# Patient Record
Sex: FEMALE | Race: BLACK OR AFRICAN AMERICAN | NOT HISPANIC OR LATINO | Employment: FULL TIME | URBAN - METROPOLITAN AREA
[De-identification: names, ages, dates, MRNs, and addresses within clinical notes are randomized per-mention and may not be internally consistent; named-entity substitution may affect disease eponyms.]

---

## 2020-08-18 ENCOUNTER — HOSPITAL ENCOUNTER (EMERGENCY)
Facility: HOSPITAL | Age: 65
Discharge: HOME | End: 2020-08-18
Attending: EMERGENCY MEDICINE
Payer: MEDICAID

## 2020-08-18 VITALS
BODY MASS INDEX: 22.63 KG/M2 | OXYGEN SATURATION: 99 % | TEMPERATURE: 98 F | HEIGHT: 62 IN | SYSTOLIC BLOOD PRESSURE: 158 MMHG | WEIGHT: 123 LBS | HEART RATE: 74 BPM | DIASTOLIC BLOOD PRESSURE: 78 MMHG | RESPIRATION RATE: 18 BRPM

## 2020-08-18 DIAGNOSIS — I73.9 PAD (PERIPHERAL ARTERY DISEASE): ICD-10-CM

## 2020-08-18 LAB
ALBUMIN SERPL BCP-MCNC: 3.6 G/DL (ref 3.5–5.2)
ALP SERPL-CCNC: 92 U/L (ref 55–135)
ALT SERPL W/O P-5'-P-CCNC: 21 U/L (ref 10–44)
ANION GAP SERPL CALC-SCNC: 5 MMOL/L (ref 8–16)
AST SERPL-CCNC: 26 U/L (ref 10–40)
BASOPHILS # BLD AUTO: 0.03 K/UL (ref 0–0.2)
BASOPHILS NFR BLD: 0.4 % (ref 0–1.9)
BILIRUB SERPL-MCNC: 0.3 MG/DL (ref 0.1–1)
BUN SERPL-MCNC: 14 MG/DL (ref 8–23)
CALCIUM SERPL-MCNC: 9.5 MG/DL (ref 8.7–10.5)
CHLORIDE SERPL-SCNC: 108 MMOL/L (ref 95–110)
CO2 SERPL-SCNC: 26 MMOL/L (ref 23–29)
CREAT SERPL-MCNC: 1 MG/DL (ref 0.5–1.4)
DIFFERENTIAL METHOD: ABNORMAL
EOSINOPHIL # BLD AUTO: 0.1 K/UL (ref 0–0.5)
EOSINOPHIL NFR BLD: 0.6 % (ref 0–8)
ERYTHROCYTE [DISTWIDTH] IN BLOOD BY AUTOMATED COUNT: 13.1 % (ref 11.5–14.5)
EST. GFR  (AFRICAN AMERICAN): >60 ML/MIN/1.73 M^2
EST. GFR  (NON AFRICAN AMERICAN): >60 ML/MIN/1.73 M^2
GLUCOSE SERPL-MCNC: 98 MG/DL (ref 70–110)
HCT VFR BLD AUTO: 46.6 % (ref 37–48.5)
HGB BLD-MCNC: 14.7 G/DL (ref 12–16)
IMM GRANULOCYTES # BLD AUTO: 0.01 K/UL (ref 0–0.04)
IMM GRANULOCYTES NFR BLD AUTO: 0.1 % (ref 0–0.5)
INR PPP: 1 (ref 0.8–1.2)
LYMPHOCYTES # BLD AUTO: 2.9 K/UL (ref 1–4.8)
LYMPHOCYTES NFR BLD: 35.9 % (ref 18–48)
MCH RBC QN AUTO: 30 PG (ref 27–31)
MCHC RBC AUTO-ENTMCNC: 31.5 G/DL (ref 32–36)
MCV RBC AUTO: 95 FL (ref 82–98)
MONOCYTES # BLD AUTO: 0.6 K/UL (ref 0.3–1)
MONOCYTES NFR BLD: 7.6 % (ref 4–15)
NEUTROPHILS # BLD AUTO: 4.4 K/UL (ref 1.8–7.7)
NEUTROPHILS NFR BLD: 55.4 % (ref 38–73)
NRBC BLD-RTO: 0 /100 WBC
PLATELET # BLD AUTO: 292 K/UL (ref 150–350)
PMV BLD AUTO: 8.9 FL (ref 9.2–12.9)
POTASSIUM SERPL-SCNC: 4 MMOL/L (ref 3.5–5.1)
PROT SERPL-MCNC: 8 G/DL (ref 6–8.4)
PROTHROMBIN TIME: 10.9 SEC (ref 9–12.5)
RBC # BLD AUTO: 4.9 M/UL (ref 4–5.4)
SODIUM SERPL-SCNC: 139 MMOL/L (ref 136–145)
WBC # BLD AUTO: 7.93 K/UL (ref 3.9–12.7)

## 2020-08-18 PROCEDURE — 85610 PROTHROMBIN TIME: CPT

## 2020-08-18 PROCEDURE — 99285 EMERGENCY DEPT VISIT HI MDM: CPT | Mod: ,,, | Performed by: PHYSICIAN ASSISTANT

## 2020-08-18 PROCEDURE — 99285 PR EMERGENCY DEPT VISIT,LEVEL V: ICD-10-PCS | Mod: ,,, | Performed by: PHYSICIAN ASSISTANT

## 2020-08-18 PROCEDURE — 99284 EMERGENCY DEPT VISIT MOD MDM: CPT | Mod: 25

## 2020-08-18 PROCEDURE — 85025 COMPLETE CBC W/AUTO DIFF WBC: CPT

## 2020-08-18 PROCEDURE — 80053 COMPREHEN METABOLIC PANEL: CPT

## 2020-08-18 RX ORDER — NAPROXEN SODIUM 220 MG/1
81 TABLET, FILM COATED ORAL DAILY
Qty: 30 TABLET | Refills: 0 | Status: SHIPPED | OUTPATIENT
Start: 2020-08-18 | End: 2020-09-17 | Stop reason: SDUPTHER

## 2020-08-18 RX ORDER — TRAMADOL HYDROCHLORIDE 50 MG/1
50 TABLET ORAL EVERY 6 HOURS
COMMUNITY
End: 2021-07-12

## 2020-08-18 RX ORDER — ATORVASTATIN CALCIUM 10 MG/1
10 TABLET, FILM COATED ORAL DAILY
Qty: 30 TABLET | Refills: 0 | Status: SHIPPED | OUTPATIENT
Start: 2020-08-18 | End: 2020-09-17 | Stop reason: SDUPTHER

## 2020-08-18 NOTE — ED TRIAGE NOTES
Judith Villanueva, a 64 y.o. female presents to the ED via personal transportation with CC right calf pain x1 week, also c/o numbness to toes on right foot. Reports injury last week, while moving a bed it hit anterior of her lower leg, scab noted to site, seen by PCP and had an US done, told to come to ED for DVT. Rates pain 5/10 at this time. No complaints of CP or SOB.    Patient identifiers verified verbally with patient and correct for Judith Villanueva.    SKIN: Skin is warm dry and intact, and color is consistent with ethnicity. Capillary refill <3 seconds. No breakdown or brusing visible. Mucus membranes moist, acyanotic.  RESPIRATORY: Airway is open and patent. Respirations-spontaneous, unlabored, regular rate, equal bilaterally on inspiration and expiration. No accessory muscle use noted. Lungs clear to auscultation in all fields bilaterally anterior and posterior.   CARDIAC: Patient has regular heart rate.  No peripheral edema noted, and patient has no c/o chest pain. Peripheral pulses present equal and strong throughout.  ABDOMEN: Soft and non-tender to palpation with no distention noted. Normoactive bowel sounds x4 quadrants. Pt has no complaints of abnormal bowel movements, denies blood in stool. Pt reports normal appetite.   NEUROLOGIC: Eyes open spontaneously and facial expression symmetrical. Pt behavior appropriate to situation, and pt follows commands. Pt reports sensation present in all extremities when touched with a finger, denies any numbness or tingling. PERRLA  MUSCULOSKELETAL: Spontaneous movement noted to all extremities. Hand  equal and leg strength strong +5 bilaterally. Pt c/o right calf pain, skin taut, pedal pulse palpable bilaterally.

## 2020-08-18 NOTE — ED PROVIDER NOTES
Encounter Date: 8/18/2020       History     Chief Complaint   Patient presents with    Deep Vein Thrombosis     had ultrasound today     The patient is a 64 year old female referred to the ER by her PCP due to abnormal venous ultrasound findings of her right leg. She has been c/o calf pain x 2 weeks. Pain worse with walking. She denies any chest pain or SOB. She was told that she has a blood clot.         Review of patient's allergies indicates:  No Known Allergies  History reviewed. No pertinent past medical history.  Past Surgical History:   Procedure Laterality Date    HYSTERECTOMY       History reviewed. No pertinent family history.  Social History     Tobacco Use    Smoking status: Current Every Day Smoker     Packs/day: 0.50     Types: Cigarettes    Smokeless tobacco: Never Used    Tobacco comment: quit today   Substance Use Topics    Alcohol use: Not Currently    Drug use: Not Currently     Review of Systems   Constitutional: Negative for chills, diaphoresis and fever.   Eyes: Negative for pain and visual disturbance.   Respiratory: Negative for chest tightness and shortness of breath.    Cardiovascular: Negative for chest pain.   Gastrointestinal: Negative for abdominal pain, blood in stool, diarrhea, nausea and vomiting.   Genitourinary: Negative for decreased urine volume.   Musculoskeletal: Positive for myalgias. Negative for arthralgias, back pain, gait problem and joint swelling.   Skin: Negative for color change, rash and wound.   Allergic/Immunologic: Negative for immunocompromised state.   Neurological: Negative for dizziness, syncope, weakness, light-headedness, numbness and headaches.   Psychiatric/Behavioral: Negative for confusion.       Physical Exam     Initial Vitals [08/18/20 1537]   BP Pulse Resp Temp SpO2   (!) 171/79 100 18 98.6 °F (37 °C) 97 %      MAP       --         Physical Exam    Nursing note and vitals reviewed.  Constitutional: She appears well-developed and well-nourished.  She is not diaphoretic. No distress.   HENT:   Head: Normocephalic.   Eyes: Conjunctivae are normal.   Neck: Neck supple.   Cardiovascular: Normal rate and regular rhythm.   Pulmonary/Chest: No respiratory distress.   Abdominal: Soft. There is no abdominal tenderness. There is no rebound and no guarding.   Musculoskeletal: Normal range of motion.      Comments: Mild right calf tenderness. Decreased pulses   Neurological: She is alert and oriented to person, place, and time. She has normal strength. No sensory deficit.   Skin: Skin is warm and dry. No rash noted.   Psychiatric: She has a normal mood and affect. Her behavior is normal.         ED Course   Procedures  Labs Reviewed   CBC W/ AUTO DIFFERENTIAL - Abnormal; Notable for the following components:       Result Value    Mean Corpuscular Hemoglobin Conc 31.5 (*)     MPV 8.9 (*)     All other components within normal limits   COMPREHENSIVE METABOLIC PANEL - Abnormal; Notable for the following components:    Anion Gap 5 (*)     All other components within normal limits   PROTIME-INR          Imaging Results           US Lower Extrem Arteries Bilat with HUSEYIN (xpd) (Final result)  Result time 08/18/20 18:56:04   Procedure changed from US Lower Extremity Arteries Bilateral     Final result by Cyrus Kim MD (08/18/20 18:56:04)                 Impression:      Complete occlusion of the right distal superficial femoral artery with collateral vessel formation and reconstitution of flow in the proximal popliteal artery.  Extremely sluggish flow with abnormal monophasic waveforms throughout the distal right lower extremity distal to the site of occlusion.    Abnormal doubling of peak systolic velocities in the left distal superficial femoral artery consistent with hemodynamically significant stenosis.    This report was flagged in Epic as abnormal.    This critical information above was relayed by Dr Man by telephone to the ordering provider Channing Garcia PA-C on  08/18/2020 at 18:40.    Electronically signed by resident: Giancarlo Man  Date:    08/18/2020  Time:    18:30    Electronically signed by: Cyrus Kim MD  Date:    08/18/2020  Time:    18:56             Narrative:    EXAMINATION:  US ARTERIAL LOWER EXTREMITY BILAT WITH HUSEYIN (XPD)    CLINICAL HISTORY:  PAD;  Peripheral vascular disease, unspecified    TECHNIQUE:  Ankle-brachial indices were calculated following measurement of the brachial systolic as well as the posterior tibial and dorsalis pedis systolic pressures.  Additionally, real-time ultrasound as well as Doppler spectral waveform analysis and color flow imaging was performed of the large vessels of both lower extremities.    COMPARISON:  08/18/2020 at 12:32 hours.    FINDINGS:  RIGHT LOWER EXTREMITY:    The peak systolic velocities on the right are as follows, in centimeters/second:    Common femoral artery: 110    Deep femoral artery: 82    Superficial femoral artery, proximal: 30    Superficial femoral artery, mid portion: 54    Superficial femoral artery, distal: 0    Popliteal artery, proximal: 4    Popliteal artery, distal: 16    Anterior tibial artery: 7    Posterior tibial artery: 13    There is complete occlusion of the right distal superficial femoral artery with collateral vessel formation and distal reconstitution of the proximal popliteal artery.  Abnormal monophasic waveforms are present proximal to the site of occlusion in the proximal and mid superficial femoral arteries as well as within the popliteal, anterior tibial, and posterior tibial arteries.  Normal triphasic waveforms are present within the common femoral artery and deep femoral artery.  There is a significant burden of atherosclerotic plaque present throughout right lower extremity.    LEFT LOWER EXTREMITY:    The peak systolic velocities on the left are as follows, in centimeters/second:    Common femoral artery: 71    Deep femoral artery: 85    Superficial femoral artery,  "proximal: 75    Superficial femoral artery, mid portion: 91    Superficial femoral artery, distal: There is abnormal doubling of velocities within the distal superficial femoral artery which measure 97 cm/sec more proximally, increasing to 211 cm/sec at the site of most significant stenosis, and decreased into 121 cm/sec more distally.    Popliteal artery, proximal: 32    Popliteal artery, distal: 51    Anterior tibial artery: 45    Posterior tibial artery: 24    Abnormal monophasic waveforms are present within the deep femoral artery, though it appears widely patent.  Remainder of the left lower extremity arteries maintain normal waveform.  Moderate to severe atherosclerotic burden throughout the left lower extremity.    Systolic blood pressures as follows:    Right ankle: Not obtainable.    Right brachial: 162    Right HUSEYIN: Could not be calculated to significant atherosclerotic burden.    Left ankle: 153    Left brachial: 162    Left HUSEYIN: 0.94                                 Medical Decision Making:   History:   Old Medical Records: I decided to obtain old medical records.  Initial Assessment:   Pt had venous US of RLE done outpatient today ordered by PCP for evaluation of calf pain. US report positive for DVT, prompting her visit to ER. Venous US report also mentions "incidental" arterial stenosis and that arterial studies are recommended. Pt describes bilateral calf pain worse with walking, requiring her to stop, suspicious for claudication. No CP or SOB.   Differential Diagnosis:   DVT, PVD, PAD, PE, etc   Clinical Tests:   Radiological Study: Ordered and Reviewed  ED Management:  I discussed the case with the ER attending physician   I discussed the case in detail with vascular surgeon before and after he evaluated the patient. He states that he reviewed all results and that the patient does not have a DVT, he assumes the area in question is a valve. He states that she does have PAD and claudication. He " requests that she be discharged home on a daily baby aspirin and a statin, with plan to see her in 1 week outpatient.   Pt comfortable with plan, verbalized understanding.                                  Clinical Impression:       ICD-10-CM ICD-9-CM   1. PAD (peripheral artery disease)  I73.9 443.9         Disposition:   Disposition: Discharged  Condition: Stable     ED Disposition Condition    Discharge Stable        ED Prescriptions     Medication Sig Dispense Start Date End Date Auth. Provider    aspirin 81 MG Chew Take 1 tablet (81 mg total) by mouth once daily. 30 tablet 8/18/2020 8/18/2021 Channing Garcia PA-C    atorvastatin (LIPITOR) 10 MG tablet Take 1 tablet (10 mg total) by mouth once daily. 30 tablet 8/18/2020 8/18/2021 Channing Garcia PA-C        Follow-up Information     Follow up With Specialties Details Why Contact Info Additional Information    Abdiel Sales - Cardiovasc Surg [facility] Cardiothoracic Surgery Schedule an appointment as soon as possible for a visit in 1 week  [address] [facility] [id number] [phone] [facility] - Main Building, Clinic 2nd Floor Please park in [facility] and use Clinic elevator    [facility] Emergency Medicine  If symptoms worsen in any way. [address] [facility] [id number] [phone]     Darren Roldan MD Family Medicine Schedule an appointment as soon as possible for a visit  Follow up with primary care in the next 1-2 days for re-evaluation and further management. [address]  [address]  [facility] [address]  [phone]                                        Channing Garcia PA-C  08/19/20 2046

## 2020-08-19 DIAGNOSIS — I73.9 PAD (PERIPHERAL ARTERY DISEASE): Primary | ICD-10-CM

## 2020-08-19 NOTE — CONSULTS
VASCULAR SURGERY SERVICE    CHIEF COMPLAINT:  Right leg abnormal ultrasound finding    HISTORY OF PRESENT ILLNESS: Judith Villanueva is a 64 y.o. female referred to the ER for evaluation of worrisome ultrasound findings.  She reports she dropped an item on her right leg causing pain.  She sought the advice of her primary care doctor via tele health visit, who ordered an ultrasound.  Ultrasound was personally reviewed.  There is a trivial lesion in her left common femoral vein which is likely a valve or area of sclerosis.  Incidentally found on the ultrasound was a right SFA occlusion.  She is a lifelong half pack day smoker.  She has never had any symptoms of peripheral arterial disease before last 2 weeks.  She reports half block cramping in her right calf which resolves with rest.  She adamantly denies rest pain.  She has no ulceration.  She has a strong family history of peripheral arterial disease, reporting several siblings and her father have had bypasses any amputations.    History reviewed. No pertinent past medical history.    Past Surgical History:   Procedure Laterality Date    HYSTERECTOMY         No current facility-administered medications for this encounter.     Current Outpatient Medications:     traMADoL (ULTRAM) 50 mg tablet, Take 50 mg by mouth every 6 (six) hours., Disp: , Rfl:     etodolac (LODINE) 200 MG Cap, Take 1 capsule (200 mg total) by mouth 3 (three) times daily., Disp: 30 capsule, Rfl: 0    Review of patient's allergies indicates:  No Known Allergies    History reviewed. No pertinent family history.    Social History     Tobacco Use    Smoking status: Current Every Day Smoker     Packs/day: 0.50     Types: Cigarettes    Smokeless tobacco: Never Used    Tobacco comment: quit today   Substance Use Topics    Alcohol use: Not Currently    Drug use: Not Currently       REVIEW OF SYSTEMS:  General: No chills, fever, malaise, changes in weight  HEENT: No visual changes, difficulty  "hearing  Cardiovascular: No chest pain, palpitations, claudication  Pulmonary: No dyspnea, cough, wheezing  Gastrointestinal: No nausea, vomiting, diarrhea, constipation  Genitourinary: No dysuria, low urine output, hematuria  Endocrine: No polydipsia, polyphagia  Hematologic: No fatigue with exertion, pica, pallor  Musculoskeletal: No extremity or joint pain, no back pain, no difficulty with ambulation  Neurologic: no seizures, no headaches, no weakness  Psychiatric: no mood disturbance    PHYSICAL EXAM:   BP (!) 158/78 (BP Location: Right arm, Patient Position: Sitting)   Pulse 74   Temp 98.4 °F (36.9 °C) (Oral)   Resp 18   Ht 5' 2" (1.575 m)   Wt 55.8 kg (123 lb)   SpO2 99%   Breastfeeding No   BMI 22.50 kg/m²   Constitutional:  Alert,   Well-appearing  In no distress.   Neurological: Normal speech  no focal findings  CN II - XII grossly intact.    Psychiatric: Mood and affect appropriate and symmetric.   HEENT: Normocephalic / atraumatic  PERRLA  Midline trachea  No scars across the neck   Cardiac: Regular rate and rhythm.   Pulmonary: Normal pulmonary effort.   Abdomen: Soft, not distended.    Skin: Warm without ulcers   Vascular: Palpable left DP  Monophasic right AT signal     DIAGNOSTICS:  EXAMINATION:  US ARTERIAL LOWER EXTREMITY BILAT WITH HUSEYIN (XPD)     CLINICAL HISTORY:  PAD;  Peripheral vascular disease, unspecified     TECHNIQUE:  Ankle-brachial indices were calculated following measurement of the brachial systolic as well as the posterior tibial and dorsalis pedis systolic pressures.  Additionally, real-time ultrasound as well as Doppler spectral waveform analysis and color flow imaging was performed of the large vessels of both lower extremities.     COMPARISON:  08/18/2020 at 12:32 hours.     FINDINGS:  RIGHT LOWER EXTREMITY:     The peak systolic velocities on the right are as follows, in centimeters/second:     Common femoral artery: 110     Deep femoral artery: 82     Superficial femoral " artery, proximal: 30     Superficial femoral artery, mid portion: 54     Superficial femoral artery, distal: 0     Popliteal artery, proximal: 4     Popliteal artery, distal: 16     Anterior tibial artery: 7     Posterior tibial artery: 13     There is complete occlusion of the right distal superficial femoral artery with collateral vessel formation and distal reconstitution of the proximal popliteal artery.  Abnormal monophasic waveforms are present proximal to the site of occlusion in the proximal and mid superficial femoral arteries as well as within the popliteal, anterior tibial, and posterior tibial arteries.  Normal triphasic waveforms are present within the common femoral artery and deep femoral artery.  There is a significant burden of atherosclerotic plaque present throughout right lower extremity.     LEFT LOWER EXTREMITY:     The peak systolic velocities on the left are as follows, in centimeters/second:     Common femoral artery: 71     Deep femoral artery: 85     Superficial femoral artery, proximal: 75     Superficial femoral artery, mid portion: 91     Superficial femoral artery, distal: There is abnormal doubling of velocities within the distal superficial femoral artery which measure 97 cm/sec more proximally, increasing to 211 cm/sec at the site of most significant stenosis, and decreased into 121 cm/sec more distally.     Popliteal artery, proximal: 32     Popliteal artery, distal: 51     Anterior tibial artery: 45     Posterior tibial artery: 24     Abnormal monophasic waveforms are present within the deep femoral artery, though it appears widely patent.  Remainder of the left lower extremity arteries maintain normal waveform.  Moderate to severe atherosclerotic burden throughout the left lower extremity.     Systolic blood pressures as follows:     Right ankle: Not obtainable.     Right brachial: 162     Right HUSEYIN: Could not be calculated to significant atherosclerotic burden.     Left ankle:  153     Left brachial: 162     Left HUSEYIN: 0.94     Impression:     Complete occlusion of the right distal superficial femoral artery with collateral vessel formation and reconstitution of flow in the proximal popliteal artery.  Extremely sluggish flow with abnormal monophasic waveforms throughout the distal right lower extremity distal to the site of occlusion.     Abnormal doubling of peak systolic velocities in the left distal superficial femoral artery consistent with hemodynamically significant stenosis.     This report was flagged in Epic as abnormal.     This critical information above was relayed by Dr Man by telephone to the ordering provider Channing Garcia PA-C on 08/18/2020 at 18:40.      IMPRESSION & PLAN:  64 y.o. female with intermittent claudication right lower extremity.  Despite her worrisome exam, she has no rest pain or tissue loss.  Would recommend best medical therapy for claudication:    -Secondary prevention of atherosclerotic risk factors: Goal BP <140/90, goal LDL <100, goal HbA1C <7.0  -ASA 81mg daily for prevention of MI, stroke, and acute limb ischemia  -High intensity statin (atorvastatin 40mg or rosuvastatin 20mg)  - complete smoking cessation    On review of her ultrasound, I do not feel she has a DVT.  Ultrasound finding likely a valve, or area of sclerosis. No need for anticoagulation.    Recommend she follow up with Dr. Minaya in 1-2 weeks with HUSEYIN.    Julio Bolden MD PGY-7  Vascular and Endovascular Surgery Fellow  08/18/2020

## 2020-08-31 ENCOUNTER — OFFICE VISIT (OUTPATIENT)
Dept: VASCULAR SURGERY | Facility: CLINIC | Age: 65
End: 2020-08-31
Attending: SURGERY
Payer: MEDICAID

## 2020-08-31 ENCOUNTER — HOSPITAL ENCOUNTER (OUTPATIENT)
Dept: VASCULAR SURGERY | Facility: CLINIC | Age: 65
Discharge: HOME | End: 2020-08-31
Attending: SURGERY
Payer: MEDICAID

## 2020-08-31 VITALS
BODY MASS INDEX: 21.59 KG/M2 | TEMPERATURE: 98 F | DIASTOLIC BLOOD PRESSURE: 89 MMHG | WEIGHT: 117.31 LBS | SYSTOLIC BLOOD PRESSURE: 187 MMHG | HEART RATE: 66 BPM | HEIGHT: 62 IN | RESPIRATION RATE: 18 BRPM

## 2020-08-31 DIAGNOSIS — I73.9 PAD (PERIPHERAL ARTERY DISEASE): ICD-10-CM

## 2020-08-31 DIAGNOSIS — I70.211 ATHEROSCLEROSIS OF NATIVE ARTERIES OF EXTREMITIES WITH INTERMITTENT CLAUDICATION, RIGHT LEG: Primary | ICD-10-CM

## 2020-08-31 DIAGNOSIS — I10 ESSENTIAL HYPERTENSION: ICD-10-CM

## 2020-08-31 PROCEDURE — 99213 OFFICE O/P EST LOW 20 MIN: CPT | Mod: S$PBB,,, | Performed by: SURGERY

## 2020-08-31 PROCEDURE — 93923 UPR/LXTR ART STDY 3+ LVLS: CPT | Mod: PBBFAC | Performed by: SURGERY

## 2020-08-31 PROCEDURE — 99999 PR PBB SHADOW E&M-EST. PATIENT-LVL III: CPT | Mod: PBBFAC,,, | Performed by: SURGERY

## 2020-08-31 PROCEDURE — 93923 PR NON-INVASIVE PHYSIOLOGIC STUDY EXTREMITY 3 LEVELS: ICD-10-PCS | Mod: 26,S$PBB,, | Performed by: SURGERY

## 2020-08-31 PROCEDURE — 93923 UPR/LXTR ART STDY 3+ LVLS: CPT | Mod: 26,S$PBB,, | Performed by: SURGERY

## 2020-08-31 PROCEDURE — 99213 PR OFFICE/OUTPT VISIT, EST, LEVL III, 20-29 MIN: ICD-10-PCS | Mod: S$PBB,,, | Performed by: SURGERY

## 2020-08-31 PROCEDURE — 99213 OFFICE O/P EST LOW 20 MIN: CPT | Mod: PBBFAC,25 | Performed by: SURGERY

## 2020-08-31 PROCEDURE — 99999 PR PBB SHADOW E&M-EST. PATIENT-LVL III: ICD-10-PCS | Mod: PBBFAC,,, | Performed by: SURGERY

## 2020-08-31 RX ORDER — CILOSTAZOL 50 MG/1
50 TABLET ORAL 2 TIMES DAILY
Qty: 60 TABLET | Refills: 11 | Status: SHIPPED | OUTPATIENT
Start: 2020-08-31 | End: 2021-07-12

## 2020-08-31 RX ORDER — GABAPENTIN 300 MG/1
300 CAPSULE ORAL 3 TIMES DAILY
COMMUNITY

## 2020-08-31 RX ORDER — HYDROCODONE BITARTRATE AND ACETAMINOPHEN 5; 325 MG/1; MG/1
1 TABLET ORAL EVERY 6 HOURS PRN
COMMUNITY
End: 2021-07-12

## 2020-08-31 NOTE — PROGRESS NOTES
VASCULAR SURGERY NOTE    Patient ID: Judith Villanueva is a 64 y.o. female.    I. HISTORY     Chief Complaint: RLE claudication    HPI: Judith Villanueva is a 64 y.o. female who is here today for new patient initial appointment.  She was referred for RLE claudication after an ED visit on 20.  She complains of right leg pain after walking one block. On the day she presented to the ED, she was having to stop 4-5 times per block. The pain resolves with rest. The quality of the pain is deep and cramping pain in her calf. She denies any claudication symptoms in her left leg. Since the ED visit, she has quit smoking.  She has noticed a mild improvement in her symptoms and now has to stop 1-2 times per block when before it was 4-5.  She said that prior to her presentation in the ER it took her about an hour to get home from her work and it is only a 3 block walk.  She is very scared of losing her leg because she has multiple family members who have had PA D and required amputations.  This previous experience has helped her to quit smoking since her ER visit.  She denies any history of coronary artery disease or history of aneurysm.    No MI or stroke.  Extensive family history of PAD and amputations. Denies family history of aneurysms.   Denies EtOH use. Quit 35 years ago.      Past Medical History:   Diagnosis Date    Hyperlipidemia     PVD (peripheral vascular disease)         Past Surgical History:   Procedure Laterality Date    HYSTERECTOMY         Social History     Tobacco Use   Smoking Status Former Smoker    Packs/day: 0.50    Types: Cigarettes    Quit date: 2020    Years since quittin.0   Smokeless Tobacco Never Used        Review of Systems   Constitution: Negative for chills and fever.   HENT: Negative for ear pain and hoarse voice.    Eyes: Negative for discharge and pain.   Cardiovascular: Negative for chest pain and palpitations.   Respiratory: Negative for cough, hemoptysis and shortness  of breath.    Endocrine: Negative for polydipsia and polyuria.   Skin: Negative for dry skin and rash.   Musculoskeletal: Negative for back pain and neck pain.   Gastrointestinal: Negative for abdominal pain, diarrhea, nausea and vomiting.   Genitourinary: Positive for frequency. Negative for dysuria and hematuria.   Neurological: Negative for dizziness and paresthesias.   All other systems reviewed and are negative.        II. PHYSICAL EXAM     Physical Exam   GEN: Alert/oriented, normal affect, normal speech  HEENT: atraumatic, normocephalic  CHEST: normal respiratory effort, symmetrical chest rise  CARD: Regular rate, regular rhythm  EXT: Warm, no cyanosis/edema  SKIN:  Normal texture, no rash, normal moisture content, no wounds  VASC:   RLE: Monophasic AT, absent PT  LLE: Palpable 2+ DP/PT pulses    III. ASSESSMENT & PLAN (MEDICAL DECISION MAKING)     1. Atherosclerosis of native arteries of extremities with intermittent claudication, right leg    2. Essential hypertension        Imaging Results:    HUSEYIN 08/31/2020:  R L   0.52 1.08       Assessment/Diagnosis and Plan:  64 y.o. female with 1 block RLE claudication.  At this time her symptoms are having a significant impact on her life but she is able to get to and from work and perform her activities of daily living (albeit with some difficulty).  I discussed medical and surgical treatment of claudication and low risk of limb loss (1% annually) with claudication symptoms.  I explained the treatment algorithm of medical treatment 1st with exercise/walking, cilostazol, hydration.  If medical therapy does not adequately improve her symptoms after 3 months then we can consider surgical intervention at that time.  She was hypertensive with /89mmHg in the office today. She denies history of HTN.  I encouraged her to follow-up with her PCP as soon as possible for repeat blood pressure check and adjustment of medications for goal BP <140/90.  The patient was in  agreement with this plan and all her questions were answered close were visit.    -Walking program at least 4x/week for 30min per session. Instructions provided  -Hydration 3L/day  -Start Cilostazol 50mg BID x7days then increase to 100mg BID thereafter if tolerated  -Encouraged the patient to continue smoking abstinence  -Continue ASA 81mg daily recommended for all patients with PAD  -Continue statin -- will increase to 20mg at next refill if patient tolerating current dose  -Recommend f/u with PCP for BP check and possible medication   -Control of atherosclerotic risk factors: Goal LDL <100, Goal HbA1C <7, Goal BP <140/90  -Follow up 3 months for symptom reassessment. If symptoms worsen to pain at rest or she is unable to go to work then I instructed her to call us to schedule an earlier appointment.    RADHA Minaya II, MD, RPVI  Vascular Surgeon  [facility]

## 2020-08-31 NOTE — LETTER
September 3, 2020      Julio Bolden MD  [address]  [facility] [address]           Abdiel [facility]vez - Vascular Surg 5th Fl  [address]  [facility] [address]  Phone: [phone]  Fax: [phone]          Patient: Judith Villanueva   MR Number: [id number]   YOB: 1955   Date of Visit: 8/31/2020       Dear Dr. Julio Bolden:    Thank you for referring Judith Villanueva to me for evaluation. Attached you will find relevant portions of my assessment and plan of care.    If you have questions, please do not hesitate to call me. I look forward to following Judith Villanueva along with you.    Sincerely,    RADHA Minaya II, MD    Enclosure  CC:  No Recipients    If you would like to receive this communication electronically, please contact [email or web address] or [phone] to request more information on [employer] Link access.    For providers and/or their staff who would like to refer a patient to [facility], please contact us through our one-stop-shop provider referral line, [facility], at [phone].    If you feel you have received this communication in error or would no longer like to receive these types of communications, please e-mail [email or web address]

## 2020-09-03 NOTE — PATIENT INSTRUCTIONS
A Walking Program for Peripheral Arterial Disease (PAD)  Peripheral arterial disease (PAD) is a condition where the arteries in the legs are severely damaged. When you have PAD, walking can be painful. So you may start to walk less. Walking less makes your leg muscles weaker. It then becomes harder and more painful to walk. A walking program can break this cycle. This sheet gives you tips on how to get started.     Walking farther without pain  Exercise strengthens leg muscles that are out of shape. It also trains these muscles to work with less oxygen. This helps your leg muscles work better even with reduced blood flow to your legs. When you have PAD, a walking program can be helpful. Your program can:  · Let you walk longer and farther without claudication. This is an ache in your legs during exercise that goes away with rest.  · Let you do more and be more active  · Add to your overall health and well-being  · Help you control your blood sugar and blood pressure  · Help you become healthier with no risk and at little or no cost  Getting started  Your local hospital, vascular center, or cardiac rehab center may have a special walking program for people with PAD. If so, this is your best option. But if you cant find a program, or its not covered by insurance, you can still walk on your own. Follow these steps at each session:  · Step 1. Start at a pace that lets you walk for 5 to 10 minutes before you start to feel claudication. This feeling is unpleasant, but it doesnt hurt you. Keep going until the pain makes you feel that you need to stop.  · Step 2. Stop and rest for 3 to 5 minutes, just long enough for the pain to go away. You can rest standing or sitting. (Some people like to bring along a cane or a lightweight folding chair.)  · Step 3. Again walk at a pace that lets you walk for 5 to 10 minutes more before you feel pain. This may be slower than your starting pace in step 1. Then rest again.  · Step 4.  Repeat this process until youve walked for 45 minutes. This should be about 60 to 80 minutes total, including resting time. You may not be able to do a full 45 minutes at first. Do as much as you can, and increase your time as you improve.  Making the most of your program  · Walk at least 3 times a week. Take no more than 2 days off between sessions. If you stop walking, even for a week or two, you can lose the health benefits of your program.  · Find a good place to walk. A treadmill or a track may be better at first. That way, you wont run the risk of going too far and finding that you cant walk back. Be sure to have a place to walk indoors in bad weather, such as a gym or a mall.  · Wear shoes with sturdy, flexible soles. The heel should fit without slipping. You should have room to wiggle your toes.  · Keep track of how long you walk. A pedometer will show your daily progress. It can also show how much farther you can walk as time goes by.  · Ask a friend to keep you company. You may enjoy walking with someone else. Or you may want to make your walking sessions a time to relax by yourself.  · Make it fun. Listen to music while you walk and rest. Walk in a favorite park. Get to know the people at the gym, or the folks that you pass on your route. While you rest, you can window-shop, read, or feed the birds. Do whatever will help you enjoy your exercise sessions.  Date Last Reviewed: 6/1/2016 © 2000-2017 [employer]. [address] [place] [address], [place] [address]. All rights reserved. This information is not intended as a substitute for professional medical care. Always follow your healthcare professional's instructions.

## 2020-09-16 ENCOUNTER — PATIENT MESSAGE (OUTPATIENT)
Dept: VASCULAR SURGERY | Facility: CLINIC | Age: 65
End: 2020-09-16

## 2020-09-16 RX ORDER — CILOSTAZOL 50 MG/1
50 TABLET ORAL 2 TIMES DAILY
Qty: 60 TABLET | Refills: 11 | Status: CANCELLED | OUTPATIENT
Start: 2020-09-16 | End: 2021-09-16

## 2020-09-17 RX ORDER — ATORVASTATIN CALCIUM 10 MG/1
10 TABLET, FILM COATED ORAL DAILY
Qty: 30 TABLET | Refills: 11 | Status: SHIPPED | OUTPATIENT
Start: 2020-09-17 | End: 2020-11-30

## 2020-09-17 RX ORDER — NAPROXEN SODIUM 220 MG/1
81 TABLET, FILM COATED ORAL DAILY
Qty: 30 TABLET | Refills: 11 | Status: SHIPPED | OUTPATIENT
Start: 2020-09-17 | End: 2022-01-11 | Stop reason: SDUPTHER

## 2020-09-25 ENCOUNTER — PATIENT MESSAGE (OUTPATIENT)
Dept: VASCULAR SURGERY | Facility: CLINIC | Age: 65
End: 2020-09-25

## 2020-09-29 ENCOUNTER — PATIENT MESSAGE (OUTPATIENT)
Dept: VASCULAR SURGERY | Facility: CLINIC | Age: 65
End: 2020-09-29

## 2020-10-15 ENCOUNTER — PATIENT MESSAGE (OUTPATIENT)
Dept: VASCULAR SURGERY | Facility: CLINIC | Age: 65
End: 2020-10-15

## 2020-11-04 ENCOUNTER — TELEPHONE (OUTPATIENT)
Dept: SURGERY | Facility: CLINIC | Age: 65
End: 2020-11-04

## 2020-11-04 DIAGNOSIS — Z12.11 SCREENING FOR COLON CANCER: Primary | ICD-10-CM

## 2020-11-04 RX ORDER — SODIUM CHLORIDE, SODIUM LACTATE, POTASSIUM CHLORIDE, CALCIUM CHLORIDE 600; 310; 30; 20 MG/100ML; MG/100ML; MG/100ML; MG/100ML
INJECTION, SOLUTION INTRAVENOUS CONTINUOUS
Status: CANCELLED | OUTPATIENT
Start: 2020-11-04

## 2020-11-04 RX ORDER — SODIUM CHLORIDE 0.9 % (FLUSH) 0.9 %
3 SYRINGE (ML) INJECTION
Status: CANCELLED | OUTPATIENT
Start: 2020-11-04

## 2020-11-04 RX ORDER — POLYETHYLENE GLYCOL 3350, SODIUM SULFATE ANHYDROUS, SODIUM BICARBONATE, SODIUM CHLORIDE, POTASSIUM CHLORIDE 236; 22.74; 6.74; 5.86; 2.97 G/4L; G/4L; G/4L; G/4L; G/4L
4 POWDER, FOR SOLUTION ORAL ONCE
Qty: 4000 ML | Refills: 0 | Status: SHIPPED | OUTPATIENT
Start: 2020-11-04 | End: 2020-11-04

## 2020-11-04 NOTE — TELEPHONE ENCOUNTER
Called patient in reference to a referral to Colorectal Surgery for colon cancer screening. Patient verbally consented to a Colonoscopy and requested to be scheduled for a Colonoscopy on 12/02/2020. Patient was advised a designated  is required on the day of the Colonoscopy to drive the patient home and the  must be at least. 18 years old.Colonoscopy Prep instructions were thoroughly explained and discussed with the patient. Patient acknowledges understanding Prep instructions. Patient was advised the Colonoscopy Prep instructions discussed and explained on the phone , are being mailed out to the patient's address on file. Patient's address on file was verified with the patient for accuracy of mailing. Patient's medications on file was reviewed with the patient for accuracy of information. Patient denies taking  any other medications other than those listed and verified on medication profile.Patient was explained the Colonoscopy will be performed here at [facility]. Patient was further explained the Pre-Op will call one day prior to the procedure to discuss Pre-Op instructions and what time to report for the Colonoscopy. The patient was given the opportunity to ask any questions about the Colonoscopy. No further issues were discussed.

## 2020-11-04 NOTE — TELEPHONE ENCOUNTER
----- Message from Hanna Pollard sent at 11/4/2020  9:57 AM CST -----  Regarding: colonoscopy appt  Hi Dr rogers and staff,     JADON Raymond is referring this pt to see you for a colonoscopy     Can you plz call them to scheduled?     The Order and records are in media     I'll check epic for appt and chart for updates.     Thank you,  Hanna Pollard   [facility]    [id number]

## 2020-11-04 NOTE — TELEPHONE ENCOUNTER
Good morning! Dr Aaron palomares MD - This patient is scheduled to have a Colonoscopy on 12/02/2020. A review of the patient's medication profile denotes ASPIRIN 81 MG by mouth daily, and PLETAL 100 MG by mouth twice daily. Will yo please advise on how this patient is ts to take both of the afore mentioned medications Pre and Post Colonoscopy - Thanks.

## 2020-11-09 ENCOUNTER — TELEPHONE (OUTPATIENT)
Dept: SURGERY | Facility: CLINIC | Age: 65
End: 2020-11-09

## 2020-11-09 ENCOUNTER — DOCUMENTATION ONLY (OUTPATIENT)
Dept: SURGERY | Facility: CLINIC | Age: 65
End: 2020-11-09

## 2020-11-09 NOTE — PROGRESS NOTES
Message  Received: Today  Message MD Terrell Cox II, LPN   Caller: Unspecified (Today, 11:33 AM)             Patient can keep taking the medication through the procedure if the proceduralist is ok with it. Otherwise patient can stop 3 days before (or whenever proceduralist would like to stop it) and resume the day after (or whenever the proceduralist is ok with resuming it). There is no risk to stopping the medication.  Thanks

## 2020-11-09 NOTE — TELEPHONE ENCOUNTER
Good morning Dr Aaron Ramos MD.  First let me say thanks for your prompt response to this very important patient care matter.   Just to refresh you on the conversation in our previous correspondence. This patient is scheduled to have a Colonoscopy at [facility] on 12/02/2020. The patient is noted to have the Anticoagulant (PLEATAL) 50 MG two tablets PO twice daily and Aspirin 81 MG PO daily in the medication profile. As the patient identified managing provider of this patient's medication, I previouly requested to please advise on a mediication routine for the afore mentioned medications. You suggested that the provider of the procedure should make the determination on when to start and stop the PLEATAL.Typically in this situation the managing provider of the the medication, makes the determination on how the patient should take the medication, Pre and Post procedure. Please advise on how should continue to move forward with this Medication Clearance request to achieve the best patient outcome. I am greatly appreciative of all the time you are giving this patient care matter. Thanks.

## 2020-11-12 ENCOUNTER — TELEPHONE (OUTPATIENT)
Dept: SURGERY | Facility: CLINIC | Age: 65
End: 2020-11-12

## 2020-11-12 NOTE — TELEPHONE ENCOUNTER
----- Message from Paulie Regan sent at 11/12/2020  3:13 PM CST -----  Regarding: [facility]  Contact: [facility]  Pharmacy is calling to let office know colonoscopy kit is is on back order. They have others to recommend.        [phone]

## 2020-11-12 NOTE — PROGRESS NOTES
E: Medication Clearance for Colonoscopy.  Received: 2 days ago  Message Contents   MD Terrell Martinez LPN             Thanks bonnie Tejada for the delay     Please hold 3 days prior

## 2020-11-12 NOTE — TELEPHONE ENCOUNTER
Returned call to Pharmacy regarding back order Colonoscopy Prep, will consult with ordering provider.Called patient to notify, left voice message.

## 2020-11-16 ENCOUNTER — TELEPHONE (OUTPATIENT)
Dept: SURGERY | Facility: CLINIC | Age: 65
End: 2020-11-16

## 2020-11-16 NOTE — TELEPHONE ENCOUNTER
Called patient to instruct on how to take PLEATAL 50 MG PO twice daily  and ASPIRIN 81 MG CHEW PO once daily as directed by managing provider, and agreed upon by proceduralist Pre and Post Colonoscopy. Patient was instructed as follows : stop PLEATAL 50 MG PO TWICE daily, and ASPIRIN 81 MG CHEW PO once daily, three (3) days before Colonoscopy, and resume taking the afore listed medications the next day after Colonoscopy. Patient was able to repeat  The instructions accurately time two (2xs). Patient acknowledge understanding of instructions. Patient was given an opportunity to ask any questions about the instructions given, or any questions about the Colonoscopy.

## 2020-11-30 ENCOUNTER — OFFICE VISIT (OUTPATIENT)
Dept: VASCULAR SURGERY | Facility: CLINIC | Age: 65
End: 2020-11-30
Attending: SURGERY
Payer: MEDICAID

## 2020-11-30 VITALS
WEIGHT: 125.69 LBS | DIASTOLIC BLOOD PRESSURE: 81 MMHG | TEMPERATURE: 99 F | SYSTOLIC BLOOD PRESSURE: 128 MMHG | HEART RATE: 93 BPM | HEIGHT: 62 IN | BODY MASS INDEX: 23.13 KG/M2

## 2020-11-30 DIAGNOSIS — I70.211 ATHEROSCLEROSIS OF NATIVE ARTERIES OF EXTREMITIES WITH INTERMITTENT CLAUDICATION, RIGHT LEG: Primary | ICD-10-CM

## 2020-11-30 PROCEDURE — 99212 PR OFFICE/OUTPT VISIT, EST, LEVL II, 10-19 MIN: ICD-10-PCS | Mod: S$PBB,,, | Performed by: SURGERY

## 2020-11-30 PROCEDURE — 99212 OFFICE O/P EST SF 10 MIN: CPT | Mod: S$PBB,,, | Performed by: SURGERY

## 2020-11-30 PROCEDURE — 99999 PR PBB SHADOW E&M-EST. PATIENT-LVL III: ICD-10-PCS | Mod: PBBFAC,,, | Performed by: SURGERY

## 2020-11-30 PROCEDURE — 99213 OFFICE O/P EST LOW 20 MIN: CPT | Mod: PBBFAC | Performed by: SURGERY

## 2020-11-30 PROCEDURE — 99999 PR PBB SHADOW E&M-EST. PATIENT-LVL III: CPT | Mod: PBBFAC,,, | Performed by: SURGERY

## 2020-11-30 RX ORDER — ATORVASTATIN CALCIUM 20 MG/1
20 TABLET, FILM COATED ORAL DAILY
Qty: 90 TABLET | Refills: 3 | Status: SHIPPED | OUTPATIENT
Start: 2020-11-30 | End: 2021-11-30
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
## 2020-12-19 DIAGNOSIS — U07.1 COVID-19 VIRUS DETECTED: ICD-10-CM

## 2021-04-13 DIAGNOSIS — I73.9 PAD (PERIPHERAL ARTERY DISEASE): Primary | ICD-10-CM

## 2021-04-29 ENCOUNTER — TELEPHONE (OUTPATIENT)
Dept: VASCULAR SURGERY | Facility: CLINIC | Age: 66
End: 2021-04-29

## 2021-07-12 ENCOUNTER — OFFICE VISIT (OUTPATIENT)
Dept: VASCULAR SURGERY | Facility: CLINIC | Age: 66
End: 2021-07-12
Attending: SURGERY
Payer: MEDICAID

## 2021-07-12 ENCOUNTER — HOSPITAL ENCOUNTER (OUTPATIENT)
Dept: VASCULAR SURGERY | Facility: CLINIC | Age: 66
Discharge: HOME | End: 2021-07-12
Attending: SURGERY
Payer: MEDICAID

## 2021-07-12 VITALS
DIASTOLIC BLOOD PRESSURE: 85 MMHG | HEIGHT: 62 IN | HEART RATE: 93 BPM | WEIGHT: 119.06 LBS | TEMPERATURE: 99 F | SYSTOLIC BLOOD PRESSURE: 172 MMHG | BODY MASS INDEX: 21.91 KG/M2

## 2021-07-12 DIAGNOSIS — I73.9 PAD (PERIPHERAL ARTERY DISEASE): ICD-10-CM

## 2021-07-12 DIAGNOSIS — I10 ESSENTIAL HYPERTENSION: ICD-10-CM

## 2021-07-12 DIAGNOSIS — E78.5 HYPERLIPIDEMIA, UNSPECIFIED HYPERLIPIDEMIA TYPE: ICD-10-CM

## 2021-07-12 DIAGNOSIS — F17.210 NICOTINE DEPENDENCE, CIGARETTES, UNCOMPLICATED: ICD-10-CM

## 2021-07-12 DIAGNOSIS — I70.211 ATHEROSCLEROSIS OF NATIVE ARTERIES OF EXTREMITIES WITH INTERMITTENT CLAUDICATION, RIGHT LEG: Primary | ICD-10-CM

## 2021-07-12 PROCEDURE — 93923 PR NON-INVASIVE PHYSIOLOGIC STUDY EXTREMITY 3 LEVELS: ICD-10-PCS | Mod: 26,S$PBB,, | Performed by: SURGERY

## 2021-07-12 PROCEDURE — 93923 UPR/LXTR ART STDY 3+ LVLS: CPT | Mod: PBBFAC | Performed by: SURGERY

## 2021-07-12 PROCEDURE — 99213 OFFICE O/P EST LOW 20 MIN: CPT | Mod: PBBFAC,25 | Performed by: SURGERY

## 2021-07-12 PROCEDURE — 99213 PR OFFICE/OUTPT VISIT, EST, LEVL III, 20-29 MIN: ICD-10-PCS | Mod: 25,S$PBB,, | Performed by: SURGERY

## 2021-07-12 PROCEDURE — 99406 BEHAV CHNG SMOKING 3-10 MIN: CPT | Mod: S$PBB,,, | Performed by: SURGERY

## 2021-07-12 PROCEDURE — 99999 PR PBB SHADOW E&M-EST. PATIENT-LVL III: ICD-10-PCS | Mod: PBBFAC,,, | Performed by: SURGERY

## 2021-07-12 PROCEDURE — 99213 OFFICE O/P EST LOW 20 MIN: CPT | Mod: 25,S$PBB,, | Performed by: SURGERY

## 2021-07-12 PROCEDURE — 93923 UPR/LXTR ART STDY 3+ LVLS: CPT | Mod: 26,S$PBB,, | Performed by: SURGERY

## 2021-07-12 PROCEDURE — 99406 PR TOBACCO USE CESSATION INTERMEDIATE 3-10 MINUTES: ICD-10-PCS | Mod: S$PBB,,, | Performed by: SURGERY

## 2021-07-12 PROCEDURE — 99999 PR PBB SHADOW E&M-EST. PATIENT-LVL III: CPT | Mod: PBBFAC,,, | Performed by: SURGERY

## 2021-07-12 RX ORDER — FLUTICASONE PROPIONATE 50 MCG
2 SPRAY, SUSPENSION (ML) NASAL DAILY
COMMUNITY
Start: 2021-06-23

## 2021-07-12 RX ORDER — CILOSTAZOL 100 MG/1
100 TABLET ORAL 2 TIMES DAILY
COMMUNITY
Start: 2021-06-28 | End: 2022-02-08 | Stop reason: SDUPTHER

## 2021-07-12 RX ORDER — METHOCARBAMOL 500 MG/1
TABLET, FILM COATED ORAL
Status: ON HOLD | COMMUNITY
End: 2021-11-18 | Stop reason: CLARIF

## 2021-10-28 ENCOUNTER — PATIENT MESSAGE (OUTPATIENT)
Dept: SURGERY | Facility: CLINIC | Age: 66
End: 2021-10-28
Payer: MEDICAID

## 2021-10-28 ENCOUNTER — TELEPHONE (OUTPATIENT)
Dept: SURGERY | Facility: CLINIC | Age: 66
End: 2021-10-28
Payer: MEDICAID

## 2021-10-28 DIAGNOSIS — Z12.11 SCREENING FOR COLON CANCER: Primary | ICD-10-CM

## 2021-10-28 RX ORDER — SODIUM CHLORIDE 0.9 % (FLUSH) 0.9 %
3 SYRINGE (ML) INJECTION
Status: CANCELLED | OUTPATIENT
Start: 2021-10-28

## 2021-10-28 RX ORDER — SODIUM, POTASSIUM,MAG SULFATES 17.5-3.13G
1 SOLUTION, RECONSTITUTED, ORAL ORAL DAILY
Qty: 1 KIT | Refills: 0 | Status: SHIPPED | OUTPATIENT
Start: 2021-10-28 | End: 2021-10-30

## 2021-11-22 ENCOUNTER — PATIENT MESSAGE (OUTPATIENT)
Dept: VASCULAR SURGERY | Facility: CLINIC | Age: 66
End: 2021-11-22
Payer: MEDICAID

## 2022-02-08 RX ORDER — CILOSTAZOL 100 MG/1
100 TABLET ORAL 2 TIMES DAILY
Qty: 90 TABLET | Refills: 3 | Status: SHIPPED | OUTPATIENT
Start: 2022-02-08 | End: 2023-07-04 | Stop reason: SDUPTHER

## 2022-10-03 ENCOUNTER — PATIENT MESSAGE (OUTPATIENT)
Dept: VASCULAR SURGERY | Facility: CLINIC | Age: 67
End: 2022-10-03
Payer: MEDICAID

## 2022-10-13 ENCOUNTER — PATIENT MESSAGE (OUTPATIENT)
Dept: VASCULAR SURGERY | Facility: CLINIC | Age: 67
End: 2022-10-13
Payer: MEDICAID

## 2022-10-14 DIAGNOSIS — I70.211 ATHEROSCLEROSIS OF NATIVE ARTERIES OF EXTREMITIES WITH INTERMITTENT CLAUDICATION, RIGHT LEG: Primary | ICD-10-CM

## 2023-02-03 ENCOUNTER — TELEPHONE (OUTPATIENT)
Dept: CARDIOLOGY | Facility: CLINIC | Age: 68
End: 2023-02-03
Payer: MEDICAID

## 2023-02-03 NOTE — TELEPHONE ENCOUNTER
Left voicemail for patient asking for a call back and I will assist with rescheduling missed appointment with NP Hanna Nichols.  Call back number supplied on voicemail.

## 2023-06-26 RX ORDER — NAPROXEN SODIUM 220 MG/1
81 TABLET, FILM COATED ORAL DAILY
Qty: 30 TABLET | Refills: 11 | Status: SHIPPED | OUTPATIENT
Start: 2023-06-26 | End: 2023-08-26 | Stop reason: SDUPTHER

## 2023-07-19 RX ORDER — CILOSTAZOL 100 MG/1
100 TABLET ORAL 2 TIMES DAILY
Qty: 90 TABLET | Refills: 3 | Status: SHIPPED | OUTPATIENT
Start: 2023-07-19

## 2023-08-26 RX ORDER — NAPROXEN SODIUM 220 MG/1
81 TABLET, FILM COATED ORAL DAILY
Qty: 30 TABLET | Refills: 11 | Status: SHIPPED | OUTPATIENT
Start: 2023-08-26 | End: 2024-08-25

## 2023-08-28 DIAGNOSIS — Z12.31 ENCOUNTER FOR SCREENING MAMMOGRAM FOR MALIGNANT NEOPLASM OF BREAST: Primary | ICD-10-CM

## 2025-03-20 DIAGNOSIS — I73.9 PAD (PERIPHERAL ARTERY DISEASE): Primary | ICD-10-CM

## 2025-03-24 ENCOUNTER — TELEPHONE (OUTPATIENT)
Dept: SMOKING CESSATION | Facility: CLINIC | Age: 70
End: 2025-03-24
Payer: MEDICAID

## 2025-03-24 NOTE — TELEPHONE ENCOUNTER
Returned patient's call-Patient would like to reschedule her intake appointment.  Patient is rescheduled on April 02, 2025 at 2:30 PM.

## 2025-04-02 ENCOUNTER — CLINICAL SUPPORT (OUTPATIENT)
Dept: SMOKING CESSATION | Facility: CLINIC | Age: 70
End: 2025-04-02
Payer: COMMERCIAL

## 2025-04-02 DIAGNOSIS — F17.200 NICOTINE DEPENDENCE: Primary | ICD-10-CM

## 2025-04-02 PROCEDURE — 99404 PREV MED CNSL INDIV APPRX 60: CPT | Mod: S$GLB,,,

## 2025-04-02 PROCEDURE — 99999 PR PBB SHADOW E&M-EST. PATIENT-LVL II: CPT | Mod: PBBFAC,,,

## 2025-04-02 RX ORDER — IBUPROFEN 200 MG
1 TABLET ORAL DAILY
Qty: 14 PATCH | Refills: 0 | Status: SHIPPED | OUTPATIENT
Start: 2025-04-02

## 2025-04-02 NOTE — Clinical Note
Patient was seen in clinic today for intake.  Patient smokes 17 cpd.   Patient's CO monitor was 13   ppm.  Patient will begin the prescribed tobacco cessation medication regimen of  21 mg nicotine patch Qd.   Completion of TCRS (Tobacco Cessation Rating Scale) reviewed learned addiction, model, personal reasons for quitting, medications and goals. Prescribed medication management will be by physician.  Patient will continue with sessions and medication monitoring by CTTS.

## 2025-04-03 ENCOUNTER — TELEPHONE (OUTPATIENT)
Dept: PODIATRY | Facility: CLINIC | Age: 70
End: 2025-04-03
Payer: MEDICARE

## 2025-04-03 NOTE — TELEPHONE ENCOUNTER
Spoke with patient and informed her that we don't offer transportation. Pt verbalized understanding and no further issues discussed.----- Message from Marzena sent at 4/3/2025  8:18 AM CDT -----  Regarding: Questions about transportation  Contact: Pt @[phone]  Pt is calling to ask if the office offers transportation to patients. Please c/b to advise..thanks

## 2025-04-15 ENCOUNTER — TELEPHONE (OUTPATIENT)
Dept: SMOKING CESSATION | Facility: CLINIC | Age: 70
End: 2025-04-15
Payer: MEDICARE

## 2025-04-15 NOTE — TELEPHONE ENCOUNTER
Returned patient's call-Patient would like to reschedule her follow up appointment.  Patient is scheduled on April 30, 2025 at 8 AM.

## 2025-04-17 ENCOUNTER — HOSPITAL ENCOUNTER (OUTPATIENT)
Dept: VASCULAR SURGERY | Facility: CLINIC | Age: 70
Discharge: HOME | End: 2025-04-17
Attending: SURGERY
Payer: MEDICARE

## 2025-04-17 ENCOUNTER — OFFICE VISIT (OUTPATIENT)
Dept: VASCULAR SURGERY | Facility: CLINIC | Age: 70
End: 2025-04-17
Attending: SURGERY
Payer: MEDICARE

## 2025-04-17 VITALS
HEIGHT: 62 IN | TEMPERATURE: 98 F | HEART RATE: 87 BPM | SYSTOLIC BLOOD PRESSURE: 144 MMHG | WEIGHT: 112.44 LBS | BODY MASS INDEX: 20.69 KG/M2 | DIASTOLIC BLOOD PRESSURE: 84 MMHG

## 2025-04-17 DIAGNOSIS — I73.9 PAD (PERIPHERAL ARTERY DISEASE): ICD-10-CM

## 2025-04-17 DIAGNOSIS — I70.211 ATHEROSCLEROSIS OF NATIVE ARTERIES OF EXTREMITIES WITH INTERMITTENT CLAUDICATION, RIGHT LEG: Primary | ICD-10-CM

## 2025-04-17 PROCEDURE — 3288F FALL RISK ASSESSMENT DOCD: CPT | Mod: CPTII,S$GLB,, | Performed by: SURGERY

## 2025-04-17 PROCEDURE — 99203 OFFICE O/P NEW LOW 30 MIN: CPT | Mod: S$GLB,,, | Performed by: SURGERY

## 2025-04-17 PROCEDURE — 1159F MED LIST DOCD IN RCRD: CPT | Mod: CPTII,S$GLB,, | Performed by: SURGERY

## 2025-04-17 PROCEDURE — 3079F DIAST BP 80-89 MM HG: CPT | Mod: CPTII,S$GLB,, | Performed by: SURGERY

## 2025-04-17 PROCEDURE — 3008F BODY MASS INDEX DOCD: CPT | Mod: CPTII,S$GLB,, | Performed by: SURGERY

## 2025-04-17 PROCEDURE — 3077F SYST BP >= 140 MM HG: CPT | Mod: CPTII,S$GLB,, | Performed by: SURGERY

## 2025-04-17 PROCEDURE — 1101F PT FALLS ASSESS-DOCD LE1/YR: CPT | Mod: CPTII,S$GLB,, | Performed by: SURGERY

## 2025-04-17 PROCEDURE — 1126F AMNT PAIN NOTED NONE PRSNT: CPT | Mod: CPTII,S$GLB,, | Performed by: SURGERY

## 2025-04-17 PROCEDURE — 93923 UPR/LXTR ART STDY 3+ LVLS: CPT | Mod: S$GLB,,, | Performed by: SURGERY

## 2025-04-17 PROCEDURE — 99999 PR PBB SHADOW E&M-EST. PATIENT-LVL III: CPT | Mod: PBBFAC,,, | Performed by: SURGERY

## 2025-04-17 RX ORDER — CILOSTAZOL 100 MG/1
100 TABLET ORAL 2 TIMES DAILY
Qty: 60 TABLET | Refills: 11 | Status: SHIPPED | OUTPATIENT
Start: 2025-04-17

## 2025-04-17 RX ORDER — NAPROXEN SODIUM 220 MG/1
1 TABLET, FILM COATED ORAL DAILY
COMMUNITY

## 2025-04-17 RX ORDER — AMLODIPINE BESYLATE 5 MG/1
TABLET ORAL
COMMUNITY
Start: 2022-11-02

## 2025-04-17 RX ORDER — ATORVASTATIN CALCIUM 20 MG/1
1 TABLET, FILM COATED ORAL NIGHTLY
COMMUNITY

## 2025-04-17 RX ORDER — CILOSTAZOL 100 MG/1
1 TABLET ORAL 2 TIMES DAILY
COMMUNITY
End: 2025-04-17 | Stop reason: SDUPTHER

## 2025-04-17 NOTE — PROGRESS NOTES
VASCULAR SURGERY NOTE    Patient ID: Judith Villanueva is a 69 y.o. female.    I. HISTORY     Chief Complaint: RLE claudication    Initial HPI: Judith Villanueva is a 69 y.o. female whom I last saw more than 3 years ago. At that time I wrote the following:  Interval history 7/12/21:  She returns today for follow up of lower extremity claudication. She states that her symptoms have persisted but she is able to walk as far as she wants as long she walks slow enough.  If she walks fast she will begin have pain within a block.  She has been able to keep her smoking to a minimum.  She smokes occasionally when she feels stressed.  She has some days where she smokes 0 cigarettes and other days were she smokes up to 3 cigarettes.  She walks frequently because she walks to and from work.  She does feel like she has been able to learn to walk farther without pain oversewn she started walking intentionally for claudication.  She has continued her home medications.  She denies any symptoms in her left leg.      Interval history 4/17/25:  She returns today for routine follow up. She reports right claudication symptoms and is able to walk a couple blocks before having to stop. She quit smoking on 4/1 of this year but was smoking 1/2 to 3/4 pack prior to that. It has been more than 2 weeks since she last had a cigarette.  She recently ran out of her cilostazol she returns to get some more.  She has tried regimens is walking programs but has not been consistent with this.  She denies any pain at rest and denies any wounds on her feet.  When she stops walking the pain goes away in several minutes and then she can walk the same distance again.  The faster she walks the sooner she has to stop.  If she walks slowly she can walk a little bit farther before she has to stop.  She still describes the pain as a deep cramping pain in her right calf.      FAMILY HISTORY: Extensive family history of PAD and amputations. Denies family history  of aneurysms.       Past Medical History:   Diagnosis Date    Hepatitis C     Hyperlipidemia     Hypertension     PVD (peripheral vascular disease)         Past Surgical History:   Procedure Laterality Date    COLONOSCOPY  11/18/2021    COLONOSCOPY N/A 11/18/2021    Procedure: COLONOSCOPY;  Surgeon: Chinedu De Jesus MD;  Location: [facility];  Service: General;  Laterality: N/A;    HYSTERECTOMY       Social History     Occupational History    Not on file   Tobacco Use    Smoking status: Every Day     Current packs/day: 0.75     Average packs/day: 0.6 packs/day for 48.5 years (30.6 ttl pk-yrs)     Types: Cigarettes     Start date: 11/1974     Last attempt to quit: 11/1990    Smokeless tobacco: Never   Substance and Sexual Activity    Alcohol use: Not Currently    Drug use: Not Currently    Sexual activity: Not on file         Review of Systems   Constitutional: Negative for weight loss.   HENT:  Negative for ear pain and nosebleeds.    Eyes:  Negative for discharge and pain.   Cardiovascular:  Negative for chest pain and palpitations.   Respiratory:  Negative for cough, shortness of breath and wheezing.    Endocrine: Negative for cold intolerance, heat intolerance and polyphagia.   Hematologic/Lymphatic: Negative for adenopathy. Does not bruise/bleed easily.   Skin:  Negative for itching and rash.   Musculoskeletal:  Negative for joint swelling and muscle cramps.   Gastrointestinal:  Negative for abdominal pain, diarrhea, nausea and vomiting.   Genitourinary:  Negative for dysuria and flank pain.   Neurological:  Negative for numbness and seizures.         II. PHYSICAL EXAM     Physical Exam  Constitutional:       General: She is not in acute distress.     Appearance: Normal appearance. She is normal weight. She is not ill-appearing or diaphoretic.   HENT:      Head: Normocephalic and atraumatic.   Eyes:      General: No scleral icterus.        Right eye: No discharge.         Left eye: No discharge.      Extraocular  Movements: Extraocular movements intact.      Conjunctiva/sclera: Conjunctivae normal.   Cardiovascular:      Rate and Rhythm: Normal rate and regular rhythm.      Comments: See detailed lower extremity vascular exam below  Pulmonary:      Effort: Pulmonary effort is normal. No respiratory distress.   Abdominal:      General: Abdomen is flat. Bowel sounds are normal. There is no distension.      Palpations: Abdomen is soft.      Tenderness: There is no abdominal tenderness. There is no guarding.   Musculoskeletal:         General: No swelling or tenderness. Normal range of motion.      Right lower leg: No edema.      Left lower leg: No edema.   Skin:     General: Skin is warm and dry.      Coloration: Skin is not jaundiced or pale.      Findings: No erythema or rash.   Neurological:      General: No focal deficit present.      Mental Status: She is alert and oriented to person, place, and time.   Psychiatric:         Mood and Affect: Mood normal.         Behavior: Behavior normal.        RLE: 2+ femoral pulse, absent pop/DP/PT pulses  LLE: 2+ femoral, absent popliteal pulse    III. ASSESSMENT & PLAN (MEDICAL DECISION MAKING)     1. Atherosclerosis of native arteries of extremities with intermittent claudication, right leg          Imaging Results:  (I have reviewed the images and provided my interpretation below)    HUSEYIN/Toe pressure 4/17/25:  R L   0.54   Toe pressure 36mmHg 0.72      Toe pressure 26mmHg       Assessment/Diagnosis and Plan:  69 y.o. female with hx of 1 block RLE claudication now improving with medical therapy.  She cannot walk more than a block if she walks fast but can walk farther if she walks slowly. We discussed options of surgery now that she has quit smoking but she would like to continue with medical therapy to see if she can improve without surgery. I congratulated her on smoking cessation and encouraged continued abstinence.     -Continue walking program at least 4x/week for 30min per  session. Instructions provided  -Continue Hydration 3L/day  -Continue Cilostazol 100mg BID  -Encouraged smoking abstinence  -Continue atorvastatin 20 mg  -Control of atherosclerotic risk factors: Goal LDL <100, Goal HbA1C <7, Goal BP <140/90  -RTC 6 months for symptom re-evaluation     RADHA Minaya II, MD, VI  Vascular Surgeon  [facility]

## 2025-04-30 ENCOUNTER — TELEPHONE (OUTPATIENT)
Dept: SMOKING CESSATION | Facility: CLINIC | Age: 70
End: 2025-04-30
Payer: MEDICARE

## 2025-04-30 NOTE — TELEPHONE ENCOUNTER
[facility]-called patient regarding canceled follow up  appointment, no answer.   Left message and contact number was given.

## 2025-05-07 ENCOUNTER — CLINICAL SUPPORT (OUTPATIENT)
Dept: SMOKING CESSATION | Facility: CLINIC | Age: 70
End: 2025-05-07
Payer: COMMERCIAL

## 2025-05-07 DIAGNOSIS — F17.200 NICOTINE DEPENDENCE: Primary | ICD-10-CM

## 2025-05-07 PROCEDURE — 99999 PR PBB SHADOW E&M-EST. PATIENT-LVL I: CPT | Mod: PBBFAC,,,

## 2025-05-07 PROCEDURE — 99407 BEHAV CHNG SMOKING > 10 MIN: CPT | Mod: S$GLB,,,

## 2025-05-07 NOTE — PROGRESS NOTES
Called patient regarding canceled follow up appointment.  Patient smokes 12 cpd.  Patient states that she is under tremendous stress due to  deaths  of family members.  We discussed the importance of stress management and finding other ways to deal with stress.  Encouraged patient to keep positive attitude during these difficult times.  Patient is scheduled on May 15, 2025 at 10 AM.

## 2025-05-15 ENCOUNTER — TELEPHONE (OUTPATIENT)
Dept: SMOKING CESSATION | Facility: CLINIC | Age: 70
End: 2025-05-15
Payer: MEDICARE

## 2025-05-15 NOTE — TELEPHONE ENCOUNTER
Called patient regarding canceled follow up  appointment.  Patient states that her sister passed and the other one is in the hospital.  Patient would like a all back in a few weeks.

## 2025-06-11 ENCOUNTER — TELEPHONE (OUTPATIENT)
Dept: SMOKING CESSATION | Facility: CLINIC | Age: 70
End: 2025-06-11
Payer: MEDICARE

## 2025-06-11 NOTE — TELEPHONE ENCOUNTER
[facility]-called patient regarding canceled follow up  appointment.  Patient states that she had to much deaths in the family and not ready to come back to the program. Patient states that she will call when ready.  Contact number was given.

## 2025-07-03 ENCOUNTER — CLINICAL SUPPORT (OUTPATIENT)
Dept: SMOKING CESSATION | Facility: CLINIC | Age: 70
End: 2025-07-03
Payer: MEDICARE

## 2025-07-03 DIAGNOSIS — F17.200 NICOTINE DEPENDENCE: Primary | ICD-10-CM

## 2025-07-03 PROCEDURE — 99407 BEHAV CHNG SMOKING > 10 MIN: CPT | Mod: S$GLB,,,
